# Patient Record
Sex: FEMALE | Race: OTHER
[De-identification: names, ages, dates, MRNs, and addresses within clinical notes are randomized per-mention and may not be internally consistent; named-entity substitution may affect disease eponyms.]

---

## 2019-08-06 ENCOUNTER — HOSPITAL ENCOUNTER (EMERGENCY)
Dept: HOSPITAL 12 - ER | Age: 59
Discharge: HOME | End: 2019-08-06
Payer: COMMERCIAL

## 2019-08-06 VITALS — SYSTOLIC BLOOD PRESSURE: 125 MMHG | DIASTOLIC BLOOD PRESSURE: 71 MMHG

## 2019-08-06 VITALS — WEIGHT: 170 LBS | HEIGHT: 67 IN | BODY MASS INDEX: 26.68 KG/M2

## 2019-08-06 DIAGNOSIS — H81.10: Primary | ICD-10-CM

## 2019-08-06 PROCEDURE — A4663 DIALYSIS BLOOD PRESSURE CUFF: HCPCS

## 2019-08-06 RX ADMIN — Medication ONE MG: at 17:08

## 2019-08-16 ENCOUNTER — HOSPITAL ENCOUNTER (EMERGENCY)
Dept: HOSPITAL 12 - ER | Age: 59
Discharge: HOME | End: 2019-08-16
Payer: COMMERCIAL

## 2019-08-16 VITALS — WEIGHT: 165 LBS | BODY MASS INDEX: 27.49 KG/M2 | HEIGHT: 65 IN

## 2019-08-16 VITALS — DIASTOLIC BLOOD PRESSURE: 77 MMHG | SYSTOLIC BLOOD PRESSURE: 125 MMHG

## 2019-08-16 DIAGNOSIS — R42: ICD-10-CM

## 2019-08-16 DIAGNOSIS — I10: Primary | ICD-10-CM

## 2019-08-16 DIAGNOSIS — E78.5: ICD-10-CM

## 2019-08-16 DIAGNOSIS — Z90.49: ICD-10-CM

## 2019-08-16 DIAGNOSIS — R11.0: ICD-10-CM

## 2019-08-16 DIAGNOSIS — F17.200: ICD-10-CM

## 2019-08-16 LAB
ALP SERPL-CCNC: 55 U/L (ref 50–136)
ALT SERPL W/O P-5'-P-CCNC: 12 U/L (ref 14–59)
AST SERPL-CCNC: 15 U/L (ref 15–37)
BASOPHILS # BLD AUTO: 0.1 K/UL (ref 0–8)
BASOPHILS NFR BLD AUTO: 0.7 % (ref 0–2)
BILIRUB DIRECT SERPL-MCNC: 0.1 MG/DL (ref 0–0.2)
BILIRUB SERPL-MCNC: 0.4 MG/DL (ref 0.2–1)
BUN SERPL-MCNC: 12 MG/DL (ref 7–18)
CHLORIDE SERPL-SCNC: 95 MMOL/L (ref 98–107)
CO2 SERPL-SCNC: 23 MMOL/L (ref 21–32)
CREAT SERPL-MCNC: 0.8 MG/DL (ref 0.6–1.3)
EOSINOPHIL # BLD AUTO: 0.1 K/UL (ref 0–0.7)
EOSINOPHIL NFR BLD AUTO: 1.5 % (ref 0–7)
GLUCOSE SERPL-MCNC: 96 MG/DL (ref 74–106)
HCT VFR BLD AUTO: 34.8 % (ref 31.2–41.9)
HGB BLD-MCNC: 11.7 G/DL (ref 10.9–14.3)
LIPASE SERPL-CCNC: 131 U/L (ref 73–393)
LYMPHOCYTES # BLD AUTO: 3.3 K/UL (ref 20–40)
LYMPHOCYTES NFR BLD AUTO: 38.4 % (ref 20.5–51.5)
MCH RBC QN AUTO: 28.9 UUG (ref 24.7–32.8)
MCHC RBC AUTO-ENTMCNC: 34 G/DL (ref 32.3–35.6)
MCV RBC AUTO: 85.9 FL (ref 75.5–95.3)
MONOCYTES # BLD AUTO: 0.7 K/UL (ref 2–10)
MONOCYTES NFR BLD AUTO: 8 % (ref 0–11)
NEUTROPHILS # BLD AUTO: 4.4 K/UL (ref 1.8–8.9)
NEUTROPHILS NFR BLD AUTO: 51.4 % (ref 38.5–71.5)
PLATELET # BLD AUTO: 312 K/UL (ref 179–408)
POTASSIUM SERPL-SCNC: 3.6 MMOL/L (ref 3.5–5.1)
RBC # BLD AUTO: 4.05 MIL/UL (ref 3.63–4.92)
WBC # BLD AUTO: 8.6 K/UL (ref 3.8–11.8)
WS STN SPEC: 7.3 G/DL (ref 6.4–8.2)

## 2019-08-16 PROCEDURE — 83690 ASSAY OF LIPASE: CPT

## 2019-08-16 PROCEDURE — 36415 COLL VENOUS BLD VENIPUNCTURE: CPT

## 2019-08-16 PROCEDURE — 80076 HEPATIC FUNCTION PANEL: CPT

## 2019-08-16 PROCEDURE — 93005 ELECTROCARDIOGRAM TRACING: CPT

## 2019-08-16 PROCEDURE — A4663 DIALYSIS BLOOD PRESSURE CUFF: HCPCS

## 2019-08-16 PROCEDURE — 80048 BASIC METABOLIC PNL TOTAL CA: CPT

## 2019-08-16 PROCEDURE — 99284 EMERGENCY DEPT VISIT MOD MDM: CPT

## 2019-08-16 PROCEDURE — 96374 THER/PROPH/DIAG INJ IV PUSH: CPT

## 2019-08-16 PROCEDURE — 96375 TX/PRO/DX INJ NEW DRUG ADDON: CPT

## 2019-08-16 PROCEDURE — 85025 COMPLETE CBC W/AUTO DIFF WBC: CPT

## 2019-08-16 PROCEDURE — 85730 THROMBOPLASTIN TIME PARTIAL: CPT

## 2019-08-16 NOTE — NUR
Patient discharged to home in stable conditon.  Written and verbal after care 
instructions given. 

Patient verbalizes understanding of instructions. Patient ambulated out of ER 
with steady gait, no acute signs of distress, VSS, all belongings taken, IV 
site discontinued, to be driven home via private vehicle by daughter.

## 2023-03-09 ENCOUNTER — HOSPITAL ENCOUNTER (EMERGENCY)
Dept: HOSPITAL 12 - ER | Age: 63
Discharge: LEFT BEFORE DISCHARGE | End: 2023-03-09
Payer: COMMERCIAL

## 2023-03-09 VITALS — WEIGHT: 175 LBS | HEIGHT: 66 IN | BODY MASS INDEX: 28.12 KG/M2

## 2023-03-09 DIAGNOSIS — R03.0: Primary | ICD-10-CM

## 2023-03-09 DIAGNOSIS — Z53.20: ICD-10-CM

## 2023-03-09 LAB
ALP SERPL-CCNC: 64 U/L (ref 50–136)
ALT SERPL W/O P-5'-P-CCNC: 18 U/L (ref 14–59)
AST SERPL-CCNC: 21 U/L (ref 15–37)
BILIRUB SERPL-MCNC: 0.5 MG/DL (ref 0.2–1)
BUN SERPL-MCNC: 19 MG/DL (ref 7–18)
CHLORIDE SERPL-SCNC: 97 MMOL/L (ref 98–107)
CO2 SERPL-SCNC: 26 MMOL/L (ref 21–32)
CREAT SERPL-MCNC: 0.8 MG/DL (ref 0.6–1.3)
GLUCOSE SERPL-MCNC: 109 MG/DL (ref 74–106)
HCT VFR BLD AUTO: 35.9 % (ref 31.2–41.9)
MCH RBC QN AUTO: 28.9 UUG (ref 24.7–32.8)
MCV RBC AUTO: 86.8 FL (ref 75.5–95.3)
PLATELET # BLD AUTO: 313 K/UL (ref 179–408)
POTASSIUM SERPL-SCNC: 3.9 MMOL/L (ref 3.5–5.1)
WS STN SPEC: 7.7 G/DL (ref 6.4–8.2)

## 2023-03-09 PROCEDURE — A4663 DIALYSIS BLOOD PRESSURE CUFF: HCPCS

## 2023-03-09 NOTE — NUR
Patient was called to have vitals signs re taken and have EKG done but was not 
present in the waiting room or outside of ER.

## 2023-03-09 NOTE — NUR
Patient was triaged and brought back to the ER waiting room, the ER is 
saturated and there are no beds available.

## 2023-03-09 NOTE — NUR
Patient was called to have vital re check and have EKG done but patient was not 
present in the waiting room or outside of ER.

## 2023-03-09 NOTE — NUR
Patient was called to be placed in room but was not present in the waiting room 
or outside of ER. Patient eloped ER.